# Patient Record
Sex: MALE | Race: WHITE | NOT HISPANIC OR LATINO
[De-identification: names, ages, dates, MRNs, and addresses within clinical notes are randomized per-mention and may not be internally consistent; named-entity substitution may affect disease eponyms.]

---

## 2020-04-27 PROBLEM — Z00.00 ENCOUNTER FOR PREVENTIVE HEALTH EXAMINATION: Status: ACTIVE | Noted: 2020-04-27

## 2020-08-05 ENCOUNTER — APPOINTMENT (OUTPATIENT)
Dept: UROLOGY | Facility: CLINIC | Age: 75
End: 2020-08-05

## 2020-08-28 ENCOUNTER — APPOINTMENT (OUTPATIENT)
Dept: UROLOGY | Facility: CLINIC | Age: 75
End: 2020-08-28
Payer: MEDICARE

## 2020-08-28 VITALS — SYSTOLIC BLOOD PRESSURE: 128 MMHG | TEMPERATURE: 98 F | DIASTOLIC BLOOD PRESSURE: 70 MMHG | HEART RATE: 75 BPM

## 2020-08-28 DIAGNOSIS — Z86.79 PERSONAL HISTORY OF OTHER DISEASES OF THE CIRCULATORY SYSTEM: ICD-10-CM

## 2020-08-28 PROCEDURE — 99204 OFFICE O/P NEW MOD 45 MIN: CPT

## 2020-08-28 RX ORDER — LOSARTAN POTASSIUM AND HYDROCHLOROTHIAZIDE 12.5; 1 MG/1; MG/1
TABLET ORAL
Refills: 0 | Status: ACTIVE | COMMUNITY

## 2020-08-28 RX ORDER — FINASTERIDE 1 MG/1
TABLET ORAL
Refills: 0 | Status: ACTIVE | COMMUNITY

## 2020-08-28 NOTE — ASSESSMENT
[FreeTextEntry1] : BPH\par continue finasteride\par biggest complaint is intermittency of stream\par trial flomax\par f/u 1 motnh

## 2020-08-28 NOTE — HISTORY OF PRESENT ILLNESS
[FreeTextEntry1] : 75M previously seen by Loren comes in for evaluation. was seeing Loren for annualy PSA screening. PSA 7/2019 1.4. +frequency. on finasteride. mild urgency. mild occasional nocturia x 1-2. IPSS 13. occasional urgency. mild decreased FOS. +hesitancy. occasional double voidiing. no family history prosttae kdkristely blalailae cancer.

## 2020-08-28 NOTE — PHYSICAL EXAM
[General Appearance - Well Developed] : well developed [Well Groomed] : well groomed [Normal Appearance] : normal appearance [General Appearance - Well Nourished] : well nourished [Heart Rate And Rhythm] : Heart rate and rhythm were normal [] : no respiratory distress [Abdomen Tenderness] : non-tender [Abdomen Soft] : soft [Abdomen Hernia] : no hernia was discovered [Urethral Meatus] : meatus normal [Costovertebral Angle Tenderness] : no ~M costovertebral angle tenderness [Penis Abnormality] : normal circumcised penis [Urinary Bladder Findings] : the bladder was normal on palpation [Scrotum] : the scrotum was normal [Epididymis] : the epididymides were normal [Testes Tenderness] : no tenderness of the testes [Testes Mass (___cm)] : there were no testicular masses [Normal Station and Gait] : the gait and station were normal for the patient's age [Oriented To Time, Place, And Person] : oriented to person, place, and time [No Focal Deficits] : no focal deficits [Skin Color & Pigmentation] : normal skin color and pigmentation [No Palpable Adenopathy] : no palpable adenopathy

## 2020-12-29 ENCOUNTER — APPOINTMENT (OUTPATIENT)
Dept: UROLOGY | Facility: CLINIC | Age: 75
End: 2020-12-29

## 2021-08-09 ENCOUNTER — RX RENEWAL (OUTPATIENT)
Age: 76
End: 2021-08-09

## 2021-08-09 RX ORDER — TAMSULOSIN HYDROCHLORIDE 0.4 MG/1
0.4 CAPSULE ORAL
Qty: 90 | Refills: 3 | Status: ACTIVE | COMMUNITY
Start: 2020-08-28 | End: 1900-01-01

## 2021-08-12 RX ORDER — FINASTERIDE 5 MG/1
5 TABLET, FILM COATED ORAL DAILY
Qty: 90 | Refills: 0 | Status: ACTIVE | COMMUNITY
Start: 2021-05-19 | End: 1900-01-01

## 2021-08-18 ENCOUNTER — APPOINTMENT (OUTPATIENT)
Dept: UROLOGY | Facility: CLINIC | Age: 76
End: 2021-08-18
Payer: MEDICARE

## 2021-08-18 VITALS
DIASTOLIC BLOOD PRESSURE: 76 MMHG | WEIGHT: 165 LBS | SYSTOLIC BLOOD PRESSURE: 156 MMHG | HEIGHT: 70 IN | TEMPERATURE: 97.5 F | BODY MASS INDEX: 23.62 KG/M2 | HEART RATE: 67 BPM | OXYGEN SATURATION: 97 %

## 2021-08-18 LAB
BILIRUB UR QL STRIP: NORMAL
CLARITY UR: CLEAR
COLLECTION METHOD: NORMAL
GLUCOSE UR-MCNC: NORMAL
HCG UR QL: 0.2 EU/DL
HGB UR QL STRIP.AUTO: NORMAL
KETONES UR-MCNC: NORMAL
LEUKOCYTE ESTERASE UR QL STRIP: NORMAL
NITRITE UR QL STRIP: NORMAL
PH UR STRIP: 5.5
PROT UR STRIP-MCNC: NORMAL
SP GR UR STRIP: 1.01

## 2021-08-18 PROCEDURE — 99214 OFFICE O/P EST MOD 30 MIN: CPT

## 2021-08-18 PROCEDURE — 51798 US URINE CAPACITY MEASURE: CPT

## 2021-08-18 NOTE — ASSESSMENT
[FreeTextEntry1] : We discussed other medications such as alfuzosin, but since the patient has mild and tolerable symptoms, we will delay trying these at present. I sent urine for culture and blood for PSA (ON FINASTERIDE). \par \par We also discussed stopping the finasteride moving forward and the potential effect this could have on his LUTS and PSA. He would like to try this and WE STOPPED THE FINASTERIDE AS OF TODAY 8/18/2021). I recommended that the patinet return in 3 months with a full bladder for URoflowmetry and PVR. Alexsandra Pimentel MD\par

## 2021-08-18 NOTE — PHYSICAL EXAM
[General Appearance - Well Developed] : well developed [General Appearance - Well Nourished] : well nourished [Normal Appearance] : normal appearance [Well Groomed] : well groomed [General Appearance - In No Acute Distress] : no acute distress [Abdomen Soft] : soft [Abdomen Tenderness] : non-tender [Abdomen Hernia] : no hernia was discovered [Costovertebral Angle Tenderness] : no ~M costovertebral angle tenderness [Urethral Meatus] : meatus normal [Epididymis] : the epididymides were normal [Testes Tenderness] : no tenderness of the testes [Testes Mass (___cm)] : there were no testicular masses [Prostate Tenderness] : the prostate was not tender [No Prostate Nodules] : no prostate nodules [Prostate Size ___ (0-4)] : prostate size [unfilled] (scale: 0-4) [Skin Turgor] : supple [Edema] : no peripheral edema [] : no respiratory distress [Oriented To Time, Place, And Person] : oriented to person, place, and time [Affect] : the affect was normal [Mood] : the mood was normal [Not Anxious] : not anxious [Normal Station and Gait] : the gait and station were normal for the patient's age [No Focal Deficits] : no focal deficits

## 2021-08-18 NOTE — LETTER BODY
[Dear  ___] : Dear  [unfilled], [Courtesy Letter:] : I had the pleasure of seeing your patient, [unfilled], in my office today. [Please see my note below.] : Please see my note below. [Consult Closing:] : Thank you very much for allowing me to participate in the care of this patient.  If you have any questions, please do not hesitate to contact me. [FreeTextEntry3] : Best Regards, \par \par Alexsandra Pimentel MD\par

## 2021-08-18 NOTE — HISTORY OF PRESENT ILLNESS
[FreeTextEntry1] : 76 YO M seen by me 7/3/2019 for BPH and Peyronie's Disease associated with a dorsal curvature that did not interfere with satisfactory intercourse. He was on finasteride and PSA was 1.36 (41%).\par \par Patient seen on 8/28/2020 by Nick with increase in LUTS, finasteride renewed, tamsulosin trial instituted.\par \par Patient seen TODAY to FU on these issues. e told me that he only tried the tamsulosin x 1 dose due to feeling of fatigue the next day. At present e is only taking the finasteride and is urinating well except for hesitancy during the night and nocturia x 2 He would like to stop the finasteride, which he has been on for ~ 4 years. \par UA negative\par PVR 13 cc\par IPSS 16\par ROXIE 19\par NAA 9\par \par Patient continue to be =on medication for his HTN and cholesterol.\par \par  The patient denies fevers, chills, nausea and or vomiting and no unexplained weight loss. \par All pertinent parts of the patient PFSH (past medical, family and social histories), laboratory, radiological studies and physician notes were reviewed prior to starting the face to face portion of the  visit. Questionnaire results were discussed with patient.\par

## 2021-08-19 LAB — PSA SERPL-MCNC: 0.96 NG/ML

## 2021-08-20 LAB — BACTERIA UR CULT: NORMAL

## 2022-01-20 ENCOUNTER — APPOINTMENT (OUTPATIENT)
Dept: UROLOGY | Facility: CLINIC | Age: 77
End: 2022-01-20
Payer: MEDICARE

## 2022-01-20 VITALS
HEIGHT: 71 IN | OXYGEN SATURATION: 98 % | WEIGHT: 165 LBS | HEART RATE: 68 BPM | SYSTOLIC BLOOD PRESSURE: 164 MMHG | BODY MASS INDEX: 23.1 KG/M2 | DIASTOLIC BLOOD PRESSURE: 75 MMHG | TEMPERATURE: 98.2 F

## 2022-01-20 PROCEDURE — 99214 OFFICE O/P EST MOD 30 MIN: CPT

## 2022-01-20 PROCEDURE — 51741 ELECTRO-UROFLOWMETRY FIRST: CPT

## 2022-01-20 PROCEDURE — 51798 US URINE CAPACITY MEASURE: CPT

## 2022-01-20 NOTE — ASSESSMENT
[FreeTextEntry1] : We discussed the patient's stable lower urinary tract symptoms off of medication and he finds these to be tolerable.  He is not anxious to take medication for this issue and I agree with this desire.  I recommended that we reassess in 6 months.\par \par As for the microscopic hematuria seen on dipstick evaluation today, I sent urine for microscopic examination culture and cytology.  If all are negative, we will reassess in 6 months otherwise we will proceed as indicated by the results. Alexsandra Pimentel MD\par

## 2022-01-20 NOTE — LETTER BODY
[Dear  ___] : Dear  [unfilled], [Courtesy Letter:] : I had the pleasure of seeing your patient, [unfilled], in my office today. [Please see my note below.] : Please see my note below. [FreeTextEntry2] : S [FreeTextEntry3] : Best Regards, \par \par Alexsandra Pimentel MD\par

## 2022-01-20 NOTE — HISTORY OF PRESENT ILLNESS
[FreeTextEntry1] : 77 YO M seen by me 7/3/2019 for BPH and Peyronie's Disease associated with a dorsal curvature that did not interfere with satisfactory intercourse. He was on finasteride and PSA was 1.36 (41%).\par \par Patient seen on 8/28/2020 by Nick with increase in LUTS, finasteride renewed, tamsulosin trial instituted.\par \par Patient seen 8/21/2021 to FU on these issues. e told me that he only tried the tamsulosin x 1 dose due to feeling of fatigue the next day. At present e is only taking the finasteride and is urinating well except for hesitancy during the night and nocturia x 2 He would like to stop the finasteride, which he has been on for ~ 4 years. \par UA negative\par PVR 13 cc\par IPSS 16\par ROXIE 19\par NAA 9\par IN view of mild and stable symptoms, no medication added and finasteride discontinued.\par \par Patient seen TODAY 1/20/2022 to reassess. He told me that he is urinating well but with an intermittent stream as his greatest complaint. He remains off medication and is satisfied with that. \par UA trace RBC\par PVR 45 cc\par Uroflow:\par Vol 444 cc\par V Max 14 cc/s\par V Ave 5 cc/s\par IPSS 14\par NAA 1\par \par Patient continue to be on medication for his HTN and cholesterol.\par \par  The patient denies fevers, chills, nausea and or vomiting and no unexplained weight loss. \par All pertinent parts of the patient PFSH (past medical, family and social histories), laboratory, radiological studies and physician notes were reviewed prior to starting the face to face portion of the  visit. Questionnaire results were discussed with patient.\par

## 2022-01-21 LAB
APPEARANCE: CLEAR
BACTERIA: NEGATIVE
BILIRUB UR QL STRIP: NORMAL
BILIRUBIN URINE: NEGATIVE
BLOOD URINE: NEGATIVE
CLARITY UR: CLEAR
COLLECTION METHOD: NORMAL
COLOR: COLORLESS
GLUCOSE QUALITATIVE U: NEGATIVE
GLUCOSE UR-MCNC: NORMAL
HCG UR QL: 0.2 EU/DL
HGB UR QL STRIP.AUTO: NORMAL
HYALINE CASTS: 0 /LPF
KETONES UR-MCNC: NORMAL
KETONES URINE: NEGATIVE
LEUKOCYTE ESTERASE UR QL STRIP: NORMAL
LEUKOCYTE ESTERASE URINE: NEGATIVE
MICROSCOPIC-UA: NORMAL
NITRITE UR QL STRIP: NORMAL
NITRITE URINE: NEGATIVE
PH UR STRIP: 5.5
PH URINE: 6
PROT UR STRIP-MCNC: NORMAL
PROTEIN URINE: NEGATIVE
RED BLOOD CELLS URINE: 0 /HPF
SP GR UR STRIP: 1.01
SPECIFIC GRAVITY URINE: 1.01
SQUAMOUS EPITHELIAL CELLS: 0 /HPF
UROBILINOGEN URINE: NORMAL
WHITE BLOOD CELLS URINE: 0 /HPF

## 2022-01-24 LAB
BACTERIA UR CULT: NORMAL
URINE CYTOLOGY: NORMAL

## 2022-07-21 ENCOUNTER — APPOINTMENT (OUTPATIENT)
Dept: UROLOGY | Facility: CLINIC | Age: 77
End: 2022-07-21

## 2022-07-21 VITALS
HEART RATE: 72 BPM | TEMPERATURE: 98 F | SYSTOLIC BLOOD PRESSURE: 158 MMHG | RESPIRATION RATE: 18 BRPM | DIASTOLIC BLOOD PRESSURE: 76 MMHG | WEIGHT: 165 LBS | HEIGHT: 71 IN | BODY MASS INDEX: 23.1 KG/M2

## 2022-07-21 DIAGNOSIS — I86.1 SCROTAL VARICES: ICD-10-CM

## 2022-07-21 DIAGNOSIS — N48.6 INDURATION PENIS PLASTICA: ICD-10-CM

## 2022-07-21 LAB
BILIRUB UR QL STRIP: NORMAL
CLARITY UR: CLEAR
COLLECTION METHOD: NORMAL
GLUCOSE UR-MCNC: NORMAL
HCG UR QL: 0.2 EU/DL
HGB UR QL STRIP.AUTO: NORMAL
KETONES UR-MCNC: NORMAL
LEUKOCYTE ESTERASE UR QL STRIP: NORMAL
NITRITE UR QL STRIP: NORMAL
PH UR STRIP: 5.5
PROT UR STRIP-MCNC: NORMAL
SP GR UR STRIP: 1

## 2022-07-21 PROCEDURE — 51741 ELECTRO-UROFLOWMETRY FIRST: CPT

## 2022-07-21 PROCEDURE — 51798 US URINE CAPACITY MEASURE: CPT

## 2022-07-21 PROCEDURE — 99214 OFFICE O/P EST MOD 30 MIN: CPT

## 2022-07-21 PROCEDURE — 81003 URINALYSIS AUTO W/O SCOPE: CPT | Mod: QW

## 2022-07-21 NOTE — PHYSICAL EXAM
[General Appearance - Well Developed] : well developed [General Appearance - Well Nourished] : well nourished [Normal Appearance] : normal appearance [Well Groomed] : well groomed [General Appearance - In No Acute Distress] : no acute distress [Abdomen Soft] : soft [Abdomen Tenderness] : non-tender [Costovertebral Angle Tenderness] : no ~M costovertebral angle tenderness [Urethral Meatus] : meatus normal [Penis Abnormality] : normal circumcised penis [Urinary Bladder Findings] : the bladder was normal on palpation [Epididymis] : the epididymides were normal [Testes Tenderness] : no tenderness of the testes [Testes Mass (___cm)] : there were no testicular masses [Edema] : no peripheral edema [] : no respiratory distress [Respiration, Rhythm And Depth] : normal respiratory rhythm and effort [Exaggerated Use Of Accessory Muscles For Inspiration] : no accessory muscle use [Oriented To Time, Place, And Person] : oriented to person, place, and time [Affect] : the affect was normal [Mood] : the mood was normal [Not Anxious] : not anxious [Normal Station and Gait] : the gait and station were normal for the patient's age [No Focal Deficits] : no focal deficits [FreeTextEntry1] : 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 No other lesions noted, no bleeding at this examination.

## 2022-07-21 NOTE — HISTORY OF PRESENT ILLNESS
[FreeTextEntry1] : 77 YO M seen by me 7/3/2019 for BPH and Peyronie's Disease associated with a dorsal curvature that did not interfere with satisfactory intercourse. He was on finasteride and PSA was 1.36 (41%).\par \par Patient seen on 8/28/2020 by Nick with increase in LUTS, finasteride renewed, tamsulosin trial instituted.\par \par Patient seen 8/21/2021 to FU on these issues. e told me that he only tried the tamsulosin x 1 dose due to feeling of fatigue the next day. At present e is only taking the finasteride and is urinating well except for hesitancy during the night and nocturia x 2 He would like to stop the finasteride, which he has been on for ~ 4 years. \par UA negative\par PVR 13 cc\par IPSS 16\par ROXIE 19\par NAA 9\par IN view of mild and stable symptoms, no medication added and finasteride discontinued. \par PSA 0.96 (= 1.92, Finasteride effect).\par \par Patient seen  1/20/2022 to reassess. He told me that he is urinating well but with an intermittent stream as his greatest complaint. He remains off medication and is satisfied with that. \par UA trace RBC\par PVR 45 cc\par Uroflow:\par Vol 444 cc\par V Max 14 cc/s\par V Ave 5 cc/s\par IPSS 14\par ANA 1\par We discussed the patient's stable lower urinary tract symptoms off of medication and he finds these to be tolerable. He is not anxious to take medication for this issue and I agree with this desire. I recommended that we reassess in 6 months.\par As for the microscopic hematuria seen on dipstick evaluation today, I sent urine for microscopic examination culture and cytology. If all are negative, we will reassess in 6 months otherwise we will proceed as indicated by the results.\par UA Micro negative\par C+S negative\par Cytology negative\par \par Patient seen TODAY to reassess off medication. H continues to urinate well off medication. He has nocturia x 1 - 2 only if he drinks a large amount of water in the evening. He is content to remain off prostate medications. He denies any dysuria or gross hematuria. Patient also describes a small bump on his scrotum that bled after being abraded with a towel recently. He is not aware how long he has had this.\par UA trace RBC, small WBC\par URoflow fair to good: Vol 126 cc, V Max 11 cc/s, V Ave 4 cc/s.\par PVR 4 cc\par IPSS 8\par ROXIE 4\par \par Patient continue to be on medication for his HTN and cholesterol.\par  The patient denies fevers, chills, nausea and or vomiting and no unexplained weight loss. \par All pertinent parts of the patient PFSH (past medical, family and social histories), laboratory, radiological studies and physician notes were reviewed prior to starting the face to face portion of the  visit. Questionnaire results were discussed with patient.\par

## 2022-07-21 NOTE — ASSESSMENT
[FreeTextEntry1] : I reviewed with the patient his present urinary symptoms in view of his uroflow and PVR test today.  It is not likely that the medications typically used for the prostate enlargement would help the symptoms in view of these test results today.  He is comfortable being off medication and I recommended that he continue this way with reassessment in 6 months at that time he will also require CARLOS.  In the meantime we discussed decreasing H2O intake after 5 PM in an effort to minimize any further nocturia.  He will try this approach.\par \par As for the new problem of the cutaneous scrotal varix, no intervention is indicated at this time, however if it continues to bleed, it would be amenable to cutaneous cauterization in the office under local anesthesia and I discussed this approach with the patient in detail including the indications risks alternatives and chances for success while I do not see the need for it to be done now he will consider it for the future if he has not further episodes of bleeding from this area.\par \par As for the asymptomatic microscopic hematuria found on dipstick today, earlier in the year when this was found microscopic evaluation did not confirm blood in the urine and culture and cytology were both negative.  I repeated the microscopic UA culture and cytology today.  While this finding has not yet been evaluated completely, I we will continue to test the urine as noted above only in the face of a negative microscopic UA. Alexsandra Pimentel MD\par

## 2022-07-25 LAB — BACTERIA UR CULT: NORMAL

## 2022-07-28 LAB
APPEARANCE: CLEAR
BACTERIA: NEGATIVE
BILIRUBIN URINE: NEGATIVE
BLOOD URINE: NEGATIVE
COLOR: NORMAL
GLUCOSE QUALITATIVE U: NEGATIVE
HYALINE CASTS: 0 /LPF
KETONES URINE: NEGATIVE
LEUKOCYTE ESTERASE URINE: ABNORMAL
MICROSCOPIC-UA: NORMAL
NITRITE URINE: NEGATIVE
PH URINE: 6
PROTEIN URINE: NEGATIVE
RED BLOOD CELLS URINE: 1 /HPF
SPECIFIC GRAVITY URINE: 1.01
SQUAMOUS EPITHELIAL CELLS: 0 /HPF
URINE CYTOLOGY: NORMAL
UROBILINOGEN URINE: NORMAL
WHITE BLOOD CELLS URINE: 11 /HPF

## 2022-07-29 ENCOUNTER — NON-APPOINTMENT (OUTPATIENT)
Age: 77
End: 2022-07-29

## 2022-07-29 LAB — PSA SERPL-MCNC: 11.3 NG/ML

## 2022-08-03 ENCOUNTER — APPOINTMENT (OUTPATIENT)
Dept: UROLOGY | Facility: CLINIC | Age: 77
End: 2022-08-03

## 2022-08-03 VITALS — SYSTOLIC BLOOD PRESSURE: 144 MMHG | HEART RATE: 72 BPM | DIASTOLIC BLOOD PRESSURE: 72 MMHG | TEMPERATURE: 97.3 F

## 2022-08-03 LAB
BILIRUB UR QL STRIP: NORMAL
CLARITY UR: CLEAR
COLLECTION METHOD: NORMAL
GLUCOSE UR-MCNC: NORMAL
HCG UR QL: 0.2 EU/DL
HGB UR QL STRIP.AUTO: NORMAL
KETONES UR-MCNC: NORMAL
LEUKOCYTE ESTERASE UR QL STRIP: NORMAL
NITRITE UR QL STRIP: NORMAL
PH UR STRIP: 7
PROT UR STRIP-MCNC: NORMAL
SP GR UR STRIP: 1.01

## 2022-08-03 PROCEDURE — 99214 OFFICE O/P EST MOD 30 MIN: CPT

## 2022-08-03 PROCEDURE — 81003 URINALYSIS AUTO W/O SCOPE: CPT | Mod: QW

## 2022-08-03 PROCEDURE — 76857 US EXAM PELVIC LIMITED: CPT

## 2022-08-03 NOTE — HISTORY OF PRESENT ILLNESS
[FreeTextEntry1] : 77 YO M seen by me 7/3/2019 for BPH and Peyronie's Disease associated with a dorsal curvature that did not interfere with satisfactory intercourse. He was on finasteride and PSA was 1.36 (41%).\par \par Patient seen on 8/28/2020 by Nick with increase in LUTS, finasteride renewed, tamsulosin trial instituted.\par \par Patient seen 8/21/2021 to FU on these issues. e told me that he only tried the tamsulosin x 1 dose due to feeling of fatigue the next day. At present e is only taking the finasteride and is urinating well except for hesitancy during the night and nocturia x 2 He would like to stop the finasteride, which he has been on for ~ 4 years. \par UA negative\par PVR 13 cc\par IPSS 16\par ROXIE 19\par NAA 9\par IN view of mild and stable symptoms, no medication added and finasteride discontinued. \par PSA 0.96 (= 1.92, Finasteride effect).\par \par Patient seen  1/20/2022 to reassess. He told me that he is urinating well but with an intermittent stream as his greatest complaint. He remains off medication and is satisfied with that. \par UA trace RBC\par PVR 45 cc\par Uroflow:\par Vol 444 cc\par V Max 14 cc/s\par V Ave 5 cc/s\par IPSS 14\par NAA 1\par We discussed the patient's stable lower urinary tract symptoms off of medication and he finds these to be tolerable. He is not anxious to take medication for this issue and I agree with this desire. I recommended that we reassess in 6 months.\par As for the microscopic hematuria seen on dipstick evaluation today, I sent urine for microscopic examination culture and cytology. If all are negative, we will reassess in 6 months otherwise we will proceed as indicated by the results.\par UA Micro negative\par C+S negative\par Cytology negative\par \par Patient seen 7/21/2022  to reassess off medication. H continues to urinate well off medication. He has nocturia x 1 - 2 only if he drinks a large amount of water in the evening. He is content to remain off prostate medications. He denies any dysuria or gross hematuria. Patient also describes a small bump on his scrotum that bled after being abraded with a towel recently. He is not aware how long he has had this.\par UA trace RBC, small WBC\par URoflow fair to good: Vol 126 cc, V Max 11 cc/s, V Ave 4 cc/s.\par PVR 4 cc\par IPSS 8\par ROXIE 4\par I  reviewed with the patient his present urinary symptoms in view of his uroflow and PVR test today. It is not likely that the medications typically used for the prostate enlargement would help the symptoms in view of these test results today. He is comfortable being off medication and I recommended that he continue this way with reassessment in 6 months at that time he will also require CARLOS. In the meantime we discussed decreasing H2O intake after 5 PM in an effort to minimize any further nocturia. He will try this approach.\par As for the new problem of the cutaneous scrotal varix, no intervention is indicated at this time, however if it continues to bleed, it would be amenable to cutaneous cauterization in the office under local anesthesia and I discussed this approach with the patient in detail including the indications risks alternatives and chances for success while I do not see the need for it to be done now he will consider it for the future if he has not further episodes of bleeding from this area.\par As for the asymptomatic microscopic hematuria found on dipstick today, earlier in the year when this was found microscopic evaluation did not confirm blood in the urine and culture and cytology were both negative. I repeated the microscopic UA culture and cytology today. While this finding has not yet been evaluated completely, I we will continue to test the urine as noted above only in the face of a negative microscopic UA.\par PSA 11.3 UA WBC, C+S negative, Cytology inflammatory cells.\par \par Patient seen TODAY 8/3/2022 to reassess PSA, CARLOS, prostate. He remains stable with no new issues.\par UA trace WBC only,\par IPSS 14\par Pelvic US: Prostate 60 gm, PVR 53 cc.\par \par Patient continue to be on medication for his HTN and cholesterol.\par  The patient denies fevers, chills, nausea and or vomiting and no unexplained weight loss. \par All pertinent parts of the patient PFSH (past medical, family and social histories), laboratory, radiological studies and physician notes were reviewed prior to starting the face to face portion of the  visit. Questionnaire results were discussed with patient.\par

## 2022-08-03 NOTE — PHYSICAL EXAM
[General Appearance - Well Developed] : well developed [General Appearance - Well Nourished] : well nourished [Normal Appearance] : normal appearance [Well Groomed] : well groomed [General Appearance - In No Acute Distress] : no acute distress [Abdomen Soft] : soft [Abdomen Tenderness] : non-tender [Costovertebral Angle Tenderness] : no ~M costovertebral angle tenderness [Prostate Tenderness] : the prostate was not tender [No Prostate Nodules] : no prostate nodules [Prostate Size ___ gm] : prostate size [unfilled] gm [FreeTextEntry1] : The prostate is normal to palpation in all respects. [Oriented To Time, Place, And Person] : oriented to person, place, and time [Affect] : the affect was normal [Mood] : the mood was normal [Not Anxious] : not anxious

## 2022-08-03 NOTE — ASSESSMENT
[FreeTextEntry1] : We discussed the recent spike in the PSA, the etiology of which remains uncertain at this time.  He does not have any significant change in his lower urinary tract symptoms but I did repeat the urine culture based on the trace white blood cells in his UA.  We also sent blood today for total and free PSA test.  If this confirms same elevation in PSA, then we will proceed to prostate MRI and likely needle biopsy.  If it is improved, then we will follow this to its priscilla and then make that decision.  We will plan to follow-up in at most 3 months. Alexsandra Pimentel MD\par

## 2022-08-05 ENCOUNTER — NON-APPOINTMENT (OUTPATIENT)
Age: 77
End: 2022-08-05

## 2022-08-05 LAB
BACTERIA UR CULT: NORMAL
PSA FREE FLD-MCNC: 30 %
PSA FREE SERPL-MCNC: 2.05 NG/ML
PSA SERPL-MCNC: 6.79 NG/ML

## 2022-08-24 ENCOUNTER — NON-APPOINTMENT (OUTPATIENT)
Age: 77
End: 2022-08-24

## 2022-08-24 LAB
PSA FREE FLD-MCNC: 41 %
PSA FREE SERPL-MCNC: 2.27 NG/ML
PSA SERPL-MCNC: 5.56 NG/ML

## 2022-09-14 ENCOUNTER — NON-APPOINTMENT (OUTPATIENT)
Age: 77
End: 2022-09-14

## 2022-09-14 LAB
PSA FREE FLD-MCNC: 44 %
PSA FREE SERPL-MCNC: 1.69 NG/ML
PSA SERPL-MCNC: 3.87 NG/ML

## 2023-01-19 ENCOUNTER — APPOINTMENT (OUTPATIENT)
Dept: UROLOGY | Facility: CLINIC | Age: 78
End: 2023-01-19
Payer: MEDICARE

## 2023-01-19 VITALS
BODY MASS INDEX: 23.62 KG/M2 | HEART RATE: 78 BPM | TEMPERATURE: 98 F | SYSTOLIC BLOOD PRESSURE: 176 MMHG | OXYGEN SATURATION: 98 % | HEIGHT: 70 IN | WEIGHT: 165 LBS | DIASTOLIC BLOOD PRESSURE: 73 MMHG

## 2023-01-19 PROCEDURE — 81003 URINALYSIS AUTO W/O SCOPE: CPT | Mod: QW

## 2023-01-19 PROCEDURE — 99213 OFFICE O/P EST LOW 20 MIN: CPT

## 2023-01-19 NOTE — ASSESSMENT
[FreeTextEntry1] : We discussed his most recent PSA which is stable. CARLOS on exam today was normal. A repeat PSA is pending and we will call him with that result. If this remains stable, he will follow up in 1 year. \par \par We also discussed his prior episode of microhematuria, which has resolved on UA dip today and requires no additional intervention at this time. Alexsandra Pimentel MD\par

## 2023-01-19 NOTE — PHYSICAL EXAM
[General Appearance - Well Developed] : well developed [General Appearance - Well Nourished] : well nourished [Normal Appearance] : normal appearance [Well Groomed] : well groomed [General Appearance - In No Acute Distress] : no acute distress [Oriented To Time, Place, And Person] : oriented to person, place, and time [Affect] : the affect was normal [Mood] : the mood was normal [Not Anxious] : not anxious [Prostate Tenderness] : the prostate was not tender [No Prostate Nodules] : no prostate nodules [Prostate Size ___ (0-4)] : prostate size [unfilled] (scale: 0-4)

## 2023-01-19 NOTE — HISTORY OF PRESENT ILLNESS
[FreeTextEntry1] : 76 YO M seen by me 7/3/2019 for BPH and Peyronie's Disease associated with a dorsal curvature that did not interfere with satisfactory intercourse. He was on finasteride and PSA was 1.36 (41%).\par \par Patient seen on 8/28/2020 by Nick with increase in LUTS, finasteride renewed, tamsulosin trial instituted.\par \par Patient seen 8/21/2021 to FU on these issues. e told me that he only tried the tamsulosin x 1 dose due to feeling of fatigue the next day. At present e is only taking the finasteride and is urinating well except for hesitancy during the night and nocturia x 2 He would like to stop the finasteride, which he has been on for ~ 4 years. \par UA negative\par PVR 13 cc\par IPSS 16\par ROXIE 19\par NAA 9\par IN view of mild and stable symptoms, no medication added and finasteride discontinued. \par PSA 0.96 (= 1.92, Finasteride effect).\par \par Patient seen  1/20/2022 to reassess. He told me that he is urinating well but with an intermittent stream as his greatest complaint. He remains off medication and is satisfied with that. \par UA trace RBC\par PVR 45 cc\par Uroflow:\par Vol 444 cc\par V Max 14 cc/s\par V Ave 5 cc/s\par IPSS 14\par NAA 1\par We discussed the patient's stable lower urinary tract symptoms off of medication and he finds these to be tolerable. He is not anxious to take medication for this issue and I agree with this desire. I recommended that we reassess in 6 months.\par As for the microscopic hematuria seen on dipstick evaluation today, I sent urine for microscopic examination culture and cytology. If all are negative, we will reassess in 6 months otherwise we will proceed as indicated by the results.\par UA Micro negative\par C+S negative\par Cytology negative\par \par Patient seen 7/21/2022  to reassess off medication. H continues to urinate well off medication. He has nocturia x 1 - 2 only if he drinks a large amount of water in the evening. He is content to remain off prostate medications. He denies any dysuria or gross hematuria. Patient also describes a small bump on his scrotum that bled after being abraded with a towel recently. He is not aware how long he has had this.\par UA trace RBC, small WBC\par URoflow fair to good: Vol 126 cc, V Max 11 cc/s, V Ave 4 cc/s.\par PVR 4 cc\par IPSS 8\par ROXIE 4\par I  reviewed with the patient his present urinary symptoms in view of his uroflow and PVR test today. It is not likely that the medications typically used for the prostate enlargement would help the symptoms in view of these test results today. He is comfortable being off medication and I recommended that he continue this way with reassessment in 6 months at that time he will also require CARLOS. In the meantime we discussed decreasing H2O intake after 5 PM in an effort to minimize any further nocturia. He will try this approach.\par As for the new problem of the cutaneous scrotal varix, no intervention is indicated at this time, however if it continues to bleed, it would be amenable to cutaneous cauterization in the office under local anesthesia and I discussed this approach with the patient in detail including the indications risks alternatives and chances for success while I do not see the need for it to be done now he will consider it for the future if he has not further episodes of bleeding from this area.\par As for the asymptomatic microscopic hematuria found on dipstick today, earlier in the year when this was found microscopic evaluation did not confirm blood in the urine and culture and cytology were both negative. I repeated the microscopic UA culture and cytology today. While this finding has not yet been evaluated completely, I we will continue to test the urine as noted above only in the face of a negative microscopic UA.\par PSA 11.3 UA WBC, C+S negative, Cytology inflammatory cells.\par \par Patient seen 8/3/2022 to reassess PSA, CARLOS, prostate. He remains stable with no new issues.\par UA trace WBC only,\par IPSS 14\par Pelvic US: Prostate 60 gm, PVR 53 cc.\par \par We discussed the recent spike in the PSA, the etiology of which remains uncertain at this time.  He does not have any significant change in his lower urinary tract symptoms but I did repeat the urine culture based on the trace white blood cells in his UA.  We also sent blood today for total and free PSA test.  If this confirms same elevation in PSA, then we will proceed to prostate MRI and likely needle biopsy.  If it is improved, then we will follow this to its priscilla and then make that decision.  We will plan to follow-up in at most 3 months. Alexsandra Pimentel MD\par PSA 3.87\par \par Patient seen TODAY 1/19/2023 to reassess PSA. A recent PSA of 3.75 was drawn on 10/3/22. His LUTS are stable and tolerable. He denies any gross hematuria.\par UA negative\par IPSS 14\par \par Patient continue to be on medication for his HTN and cholesterol.\par  The patient denies fevers, chills, nausea and or vomiting and no unexplained weight loss. \par All pertinent parts of the patient PFSH (past medical, family and social histories), laboratory, radiological studies and physician notes were reviewed prior to starting the face to face portion of the  visit. Questionnaire results were discussed with patient.\par

## 2023-01-20 ENCOUNTER — NON-APPOINTMENT (OUTPATIENT)
Age: 78
End: 2023-01-20

## 2023-01-20 LAB
BILIRUB UR QL STRIP: NORMAL
CLARITY UR: CLEAR
COLLECTION METHOD: NORMAL
GLUCOSE UR-MCNC: NORMAL
HCG UR QL: 0.2 EU/DL
HGB UR QL STRIP.AUTO: NORMAL
KETONES UR-MCNC: NORMAL
LEUKOCYTE ESTERASE UR QL STRIP: NORMAL
NITRITE UR QL STRIP: NORMAL
PH UR STRIP: 5
PROT UR STRIP-MCNC: NORMAL
PSA FREE FLD-MCNC: 45 %
PSA FREE SERPL-MCNC: 1.51 NG/ML
PSA SERPL-MCNC: 3.38 NG/ML
SP GR UR STRIP: 1.02

## 2024-02-01 ENCOUNTER — APPOINTMENT (OUTPATIENT)
Dept: UROLOGY | Facility: CLINIC | Age: 79
End: 2024-02-01
Payer: MEDICARE

## 2024-02-01 VITALS
HEART RATE: 71 BPM | BODY MASS INDEX: 23.62 KG/M2 | OXYGEN SATURATION: 98 % | HEIGHT: 70 IN | TEMPERATURE: 97.16 F | SYSTOLIC BLOOD PRESSURE: 165 MMHG | DIASTOLIC BLOOD PRESSURE: 69 MMHG | WEIGHT: 165 LBS

## 2024-02-01 DIAGNOSIS — R97.20 ELEVATED PROSTATE, SPECIFIC ANTIGEN [PSA]: ICD-10-CM

## 2024-02-01 PROCEDURE — 76857 US EXAM PELVIC LIMITED: CPT

## 2024-02-01 PROCEDURE — 99214 OFFICE O/P EST MOD 30 MIN: CPT

## 2024-02-01 PROCEDURE — 81003 URINALYSIS AUTO W/O SCOPE: CPT | Mod: QW

## 2024-02-01 NOTE — HISTORY OF PRESENT ILLNESS
[FreeTextEntry1] : 70 YO M seen by me 7/3/2019 for BPH and Peyronie's Disease associated with a dorsal curvature that did not interfere with satisfactory intercourse. He was on finasteride and PSA was 1.36 (41%).  Patient seen on 8/28/2020 by Nick with increase in LUTS, finasteride renewed, tamsulosin trial instituted.  Patient seen 8/21/2021 to FU on these issues. e told me that he only tried the tamsulosin x 1 dose due to feeling of fatigue the next day. At present e is only taking the finasteride and is urinating well except for hesitancy during the night and nocturia x 2 He would like to stop the finasteride, which he has been on for ~ 4 years.  UA negative PVR 13 cc IPSS 16 ROXIE 19 NAA 9 IN view of mild and stable symptoms, no medication added and finasteride discontinued.  PSA 0.96 (= 1.92, Finasteride effect).  Patient seen  1/20/2022 to reassess. He told me that he is urinating well but with an intermittent stream as his greatest complaint. He remains off medication and is satisfied with that.  UA trace RBC PVR 45 cc Uroflow: Vol 444 cc V Max 14 cc/s V Ave 5 cc/s IPSS 14 NAA 1 We discussed the patient's stable lower urinary tract symptoms off of medication and he finds these to be tolerable. He is not anxious to take medication for this issue and I agree with this desire. I recommended that we reassess in 6 months. As for the microscopic hematuria seen on dipstick evaluation today, I sent urine for microscopic examination culture and cytology. If all are negative, we will reassess in 6 months otherwise we will proceed as indicated by the results. UA Micro negative C+S negative Cytology negative  Patient seen 7/21/2022  to reassess off medication. H continues to urinate well off medication. He has nocturia x 1 - 2 only if he drinks a large amount of water in the evening. He is content to remain off prostate medications. He denies any dysuria or gross hematuria. Patient also describes a small bump on his scrotum that bled after being abraded with a towel recently. He is not aware how long he has had this. UA trace RBC, small WBC URoflow fair to good: Vol 126 cc, V Max 11 cc/s, V Ave 4 cc/s. PVR 4 cc IPSS 8 ROXIE 4 I  reviewed with the patient his present urinary symptoms in view of his uroflow and PVR test today. It is not likely that the medications typically used for the prostate enlargement would help the symptoms in view of these test results today. He is comfortable being off medication and I recommended that he continue this way with reassessment in 6 months at that time he will also require CARLOS. In the meantime we discussed decreasing H2O intake after 5 PM in an effort to minimize any further nocturia. He will try this approach. As for the new problem of the cutaneous scrotal varix, no intervention is indicated at this time, however if it continues to bleed, it would be amenable to cutaneous cauterization in the office under local anesthesia and I discussed this approach with the patient in detail including the indications risks alternatives and chances for success while I do not see the need for it to be done now he will consider it for the future if he has not further episodes of bleeding from this area. As for the asymptomatic microscopic hematuria found on dipstick today, earlier in the year when this was found microscopic evaluation did not confirm blood in the urine and culture and cytology were both negative. I repeated the microscopic UA culture and cytology today. While this finding has not yet been evaluated completely, I we will continue to test the urine as noted above only in the face of a negative microscopic UA. PSA 11.3 UA WBC, C+S negative, Cytology inflammatory cells.  Patient seen 8/3/2022 to reassess PSA, CARLOS, prostate. He remains stable with no new issues. UA trace WBC only, IPSS 14 Pelvic US: Prostate 60 gm, PVR 53 cc.  We discussed the recent spike in the PSA, the etiology of which remains uncertain at this time.  He does not have any significant change in his lower urinary tract symptoms but I did repeat the urine culture based on the trace white blood cells in his UA.  We also sent blood today for total and free PSA test.  If this confirms same elevation in PSA, then we will proceed to prostate MRI and likely needle biopsy.  If it is improved, then we will follow this to its priscilla and then make that decision.  We will plan to follow-up in at most 3 months. Alexsandra Pimentel MD PSA 3.87  Patient seen 1/19/2023 to reassess PSA. A recent PSA of 3.75 was drawn on 10/3/22. His LUTS are stable and tolerable. He denies any gross hematuria. UA negative IPSS 14 Patient continue to be on medication for his HTN and cholesterol.  The patient denies fevers, chills, nausea and or vomiting and no unexplained weight loss.  All pertinent parts of the patient PFSH (past medical, family and social histories), laboratory, radiological studies and physician notes were reviewed prior to starting the face to face portion of the  visit. Questionnaire results were discussed with patient. We discussed his most recent PSA which is stable. CARLOS on exam today was normal. A repeat PSA is pending and we will call him with that result. If this remains stable, he will follow up in 1 year. We also discussed his prior episode of microhematuria, which has resolved on UA dip today and requires no additional intervention at this time. .PSA 3.38 (40%), (=6.76 on finasteride)  Patient seen TODAY 2/1/2024 to reassess. He is urinating with tolerable LUTS off any medication having stopped the finasteride and tamsulosin 1 year ego. He had a recent PSA with his PCP 11/20/2023 which returned 3.11 9off the finasteride) and UA showed small RBC. HE also had anemia on CBC. UA trace RBC, not yet evaluated. IPSS 15 NAA 7 ROXIE 0 Patient and wife are no longer sexually active.  Pelvic US: PVR 63ml, PRostate 71gm.

## 2024-02-01 NOTE — PHYSICAL EXAM
[Normal Appearance] : normal appearance [Well Groomed] : well groomed [General Appearance - In No Acute Distress] : no acute distress [Edema] : no peripheral edema [Respiration, Rhythm And Depth] : normal respiratory rhythm and effort [Exaggerated Use Of Accessory Muscles For Inspiration] : no accessory muscle use [Abdomen Soft] : soft [Abdomen Tenderness] : non-tender [Abdomen Mass (___ Cm)] : no abdominal mass palpated [Costovertebral Angle Tenderness] : no ~M costovertebral angle tenderness [Urethral Meatus] : meatus normal [Penis Abnormality] : normal circumcised penis [Urinary Bladder Findings] : the bladder was normal on palpation [Epididymis] : the epididymides were normal [Testes Tenderness] : no tenderness of the testes [Testes Mass (___cm)] : there were no testicular masses [Prostate Tenderness] : the prostate was not tender [No Prostate Nodules] : no prostate nodules [Prostate Size ___ gm] : prostate size [unfilled] gm [Normal Station and Gait] : the gait and station were normal for the patient's age [] : no rash [No Focal Deficits] : no focal deficits [Oriented To Time, Place, And Person] : oriented to person, place, and time [Affect] : the affect was normal [Mood] : the mood was normal [No Palpable Adenopathy] : no palpable adenopathy

## 2024-02-01 NOTE — LETTER BODY
[Dear  ___] : Dear  [unfilled], [Courtesy Letter:] : I had the pleasure of seeing your patient, [unfilled], in my office today. [Please see my note below.] : Please see my note below. [Consult Closing:] : Thank you very much for allowing me to participate in the care of this patient.  If you have any questions, please do not hesitate to contact me. [FreeTextEntry3] : Best Regards,   Alexsandra Pimentel MD

## 2024-02-01 NOTE — ASSESSMENT
[FreeTextEntry1] : We discussed the patient's stable PSA which was repeated today and his normal CARLOS.  If the PSA remains stable as it was 3 months ago, then follow-up of this in 1 year.  As for the microscopic hematuria which was present today and present to a slightly greater extent on his UA with his PCP 3 months ago, he has not had a hematuria evaluation yet and I recommended that we consider this in view of his mild anemia found on last CBC.  To this extent urine sent for culture and cytology, CT scan abdomen pelvis ordered, cystoscopy in the office was scheduled after we reviewed indications, risks, alternatives and chances for success.  Alexsandra Pimentel MD.

## 2024-02-04 LAB
BACTERIA UR CULT: NORMAL
BILIRUB UR QL STRIP: NORMAL
CLARITY UR: CLEAR
COLLECTION METHOD: NORMAL
GLUCOSE UR-MCNC: NORMAL
HCG UR QL: 0.2 EU/DL
HGB UR QL STRIP.AUTO: ABNORMAL
KETONES UR-MCNC: NORMAL
LEUKOCYTE ESTERASE UR QL STRIP: NORMAL
NITRITE UR QL STRIP: NORMAL
PH UR STRIP: 6
PROT UR STRIP-MCNC: NORMAL
PSA FREE FLD-MCNC: 53 %
PSA FREE SERPL-MCNC: 1.61 NG/ML
PSA SERPL-MCNC: 3.02 NG/ML
SP GR UR STRIP: 1.01

## 2024-03-07 ENCOUNTER — APPOINTMENT (OUTPATIENT)
Dept: UROLOGY | Facility: CLINIC | Age: 79
End: 2024-03-07
Payer: MEDICARE

## 2024-03-07 DIAGNOSIS — R31.21 ASYMPTOMATIC MICROSCOPIC HEMATURIA: ICD-10-CM

## 2024-03-07 LAB
BILIRUB UR QL STRIP: NORMAL
BILIRUB UR QL STRIP: NORMAL
CLARITY UR: CLEAR
CLARITY UR: CLEAR
COLLECTION METHOD: NORMAL
COLLECTION METHOD: NORMAL
GLUCOSE UR-MCNC: NORMAL
GLUCOSE UR-MCNC: NORMAL
HCG UR QL: 0.2 EU/DL
HCG UR QL: 0.2 EU/DL
HGB UR QL STRIP.AUTO: NORMAL
HGB UR QL STRIP.AUTO: NORMAL
KETONES UR-MCNC: NORMAL
KETONES UR-MCNC: NORMAL
LEUKOCYTE ESTERASE UR QL STRIP: NORMAL
LEUKOCYTE ESTERASE UR QL STRIP: NORMAL
NITRITE UR QL STRIP: NORMAL
NITRITE UR QL STRIP: NORMAL
PH UR STRIP: 5.5
PH UR STRIP: 5.5
PROT UR STRIP-MCNC: NORMAL
PROT UR STRIP-MCNC: NORMAL
SP GR UR STRIP: 1.01
SP GR UR STRIP: 1.01

## 2024-03-07 PROCEDURE — 52000 CYSTOURETHROSCOPY: CPT

## 2024-03-07 PROCEDURE — 81003 URINALYSIS AUTO W/O SCOPE: CPT | Mod: QW

## 2024-03-07 NOTE — HISTORY OF PRESENT ILLNESS
[FreeTextEntry1] : 68 YO M seen by me 7/3/2019 for BPH and Peyronie's Disease associated with a dorsal curvature that did not interfere with satisfactory intercourse. He was on finasteride and PSA was 1.36 (41%).  Patient seen on 8/28/2020 by Nick with increase in LUTS, finasteride renewed, tamsulosin trial instituted.  Patient seen 8/21/2021 to FU on these issues. e told me that he only tried the tamsulosin x 1 dose due to feeling of fatigue the next day. At present e is only taking the finasteride and is urinating well except for hesitancy during the night and nocturia x 2 He would like to stop the finasteride, which he has been on for ~ 4 years.  UA negative PVR 13 cc IPSS 16 ROXIE 19 NAA 9 IN view of mild and stable symptoms, no medication added and finasteride discontinued.  PSA 0.96 (= 1.92, Finasteride effect).  Patient seen  1/20/2022 to reassess. He told me that he is urinating well but with an intermittent stream as his greatest complaint. He remains off medication and is satisfied with that.  UA trace RBC PVR 45 cc Uroflow: Vol 444 cc V Max 14 cc/s V Ave 5 cc/s IPSS 14 NAA 1 We discussed the patient's stable lower urinary tract symptoms off of medication and he finds these to be tolerable. He is not anxious to take medication for this issue and I agree with this desire. I recommended that we reassess in 6 months. As for the microscopic hematuria seen on dipstick evaluation today, I sent urine for microscopic examination culture and cytology. If all are negative, we will reassess in 6 months otherwise we will proceed as indicated by the results. UA Micro negative C+S negative Cytology negative  Patient seen 7/21/2022  to reassess off medication. H continues to urinate well off medication. He has nocturia x 1 - 2 only if he drinks a large amount of water in the evening. He is content to remain off prostate medications. He denies any dysuria or gross hematuria. Patient also describes a small bump on his scrotum that bled after being abraded with a towel recently. He is not aware how long he has had this. UA trace RBC, small WBC URoflow fair to good: Vol 126 cc, V Max 11 cc/s, V Ave 4 cc/s. PVR 4 cc IPSS 8 ROXIE 4 I  reviewed with the patient his present urinary symptoms in view of his uroflow and PVR test today. It is not likely that the medications typically used for the prostate enlargement would help the symptoms in view of these test results today. He is comfortable being off medication and I recommended that he continue this way with reassessment in 6 months at that time he will also require CARLOS. In the meantime we discussed decreasing H2O intake after 5 PM in an effort to minimize any further nocturia. He will try this approach. As for the new problem of the cutaneous scrotal varix, no intervention is indicated at this time, however if it continues to bleed, it would be amenable to cutaneous cauterization in the office under local anesthesia and I discussed this approach with the patient in detail including the indications risks alternatives and chances for success while I do not see the need for it to be done now he will consider it for the future if he has not further episodes of bleeding from this area. As for the asymptomatic microscopic hematuria found on dipstick today, earlier in the year when this was found microscopic evaluation did not confirm blood in the urine and culture and cytology were both negative. I repeated the microscopic UA culture and cytology today. While this finding has not yet been evaluated completely, I we will continue to test the urine as noted above only in the face of a negative microscopic UA. PSA 11.3 UA WBC, C+S negative, Cytology inflammatory cells.  Patient seen 8/3/2022 to reassess PSA, CARLOS, prostate. He remains stable with no new issues. UA trace WBC only, IPSS 14 Pelvic US: Prostate 60 gm, PVR 53 cc.  We discussed the recent spike in the PSA, the etiology of which remains uncertain at this time.  He does not have any significant change in his lower urinary tract symptoms but I did repeat the urine culture based on the trace white blood cells in his UA.  We also sent blood today for total and free PSA test.  If this confirms same elevation in PSA, then we will proceed to prostate MRI and likely needle biopsy.  If it is improved, then we will follow this to its priscilla and then make that decision.  We will plan to follow-up in at most 3 months. Alexsandra Pimentel MD PSA 3.87  Patient seen 1/19/2023 to reassess PSA. A recent PSA of 3.75 was drawn on 10/3/22. His LUTS are stable and tolerable. He denies any gross hematuria. UA negative IPSS 14 Patient continue to be on medication for his HTN and cholesterol.  The patient denies fevers, chills, nausea and or vomiting and no unexplained weight loss.  All pertinent parts of the patient PFSH (past medical, family and social histories), laboratory, radiological studies and physician notes were reviewed prior to starting the face to face portion of the  visit. Questionnaire results were discussed with patient. We discussed his most recent PSA which is stable. CARLOS on exam today was normal. A repeat PSA is pending and we will call him with that result. If this remains stable, he will follow up in 1 year. We also discussed his prior episode of microhematuria, which has resolved on UA dip today and requires no additional intervention at this time. .PSA 3.38 (40%), (=6.76 on finasteride)  Patient seen 2/1/2024 to reassess. He is urinating with tolerable LUTS off any medication having stopped the finasteride and tamsulosin 1 year ego. He had a recent PSA with his PCP 11/20/2023 which returned 3.11 (off the finasteride) and UA showed small RBC. HE also had anemia on CBC. UA trace RBC, not yet evaluated. IPSS 15 NAA 7 ROXIE 0 Patient and wife are no longer sexually active.  Pelvic US: PVR 63ml, Prostate 71gm. We discussed the patient's stable PSA which was repeated today and his normal CARLOS. If the PSA remains stable as it was 3 months ago, then follow-up of this in 1 year. As for the microscopic hematuria which was present today and present to a slightly greater extent on his UA with his PCP 3 months ago, he has not had a hematuria evaluation yet and I recommended that we consider this in view of his mild anemia found on last CBC. To this extent urine sent for culture and cytology, CT scan abdomen pelvis ordered, cystoscopy in the office was scheduled after we reviewed indications, risks, alternatives and chances for success. PSA 3.02 (53%) C+S, cytology both negative CT 2/7/2024 diverticulosis/itis, normal  tract, BPH.  Patient seen TODAY 3/7/2024 for cystoscopy. e reports no interval bleeding noted.  UA negative CYSTOSCOPY: Normal bladder mucosa, clear efflux bilaterally. bilobar obstructing BPH with extension into the bladder, normal urethra.

## 2024-03-07 NOTE — ASSESSMENT
[FreeTextEntry1] : CYSTOSCOPY: Normal bladder mucosa, clear efflux bilaterally. bilobar obstructing BPH with extension into the bladder, normal urethra.  We discussed the cystoscopic findings and the likely cause of his microscopic hematuria being from the enlarged prostate gland.  This is also likely cause of his lower urinary tract symptoms.  He has been in the past on finasteride and tamsulosin but stopped these in February a year ago because of what he perceived as lack of efficacy.  We discussed options moving forward including #1 continued observation as is without medication intervention.  #2 reinitiation of finasteride and or tamsulosin with interval reassessment.  #3 prostatic reductive intervention either by minimally or moderately invasive methods.  I offered a consultation with my partner Dr. Moe concerning #3 if he is interested.  For the time being, he has elected to stay off medications and reassess in 3 months.  I recommended that he call me if he finds any change in his symptoms in the interval. We will plan to have the patient perform Uroflow and obtain a PVR at that time.  Alexsandra Pimentel MD

## 2024-06-06 ENCOUNTER — APPOINTMENT (OUTPATIENT)
Dept: UROLOGY | Facility: CLINIC | Age: 79
End: 2024-06-06

## 2024-06-06 DIAGNOSIS — R35.1 BENIGN PROSTATIC HYPERPLASIA WITH LOWER URINARY TRACT SYMPMS: ICD-10-CM

## 2024-06-06 DIAGNOSIS — N40.1 BENIGN PROSTATIC HYPERPLASIA WITH LOWER URINARY TRACT SYMPMS: ICD-10-CM

## 2024-06-06 NOTE — HISTORY OF PRESENT ILLNESS
[FreeTextEntry1] : 70 YO M seen by me 7/3/2019 for BPH and Peyronie's Disease associated with a dorsal curvature that did not interfere with satisfactory intercourse. He was on finasteride and PSA was 1.36 (41%).  Patient seen on 8/28/2020 by Nick with increase in LUTS, finasteride renewed, tamsulosin trial instituted.  Patient seen 8/21/2021 to FU on these issues. e told me that he only tried the tamsulosin x 1 dose due to feeling of fatigue the next day. At present e is only taking the finasteride and is urinating well except for hesitancy during the night and nocturia x 2 He would like to stop the finasteride, which he has been on for ~ 4 years.  UA negative PVR 13 cc IPSS 16 ROXIE 19 NAA 9 IN view of mild and stable symptoms, no medication added and finasteride discontinued.  PSA 0.96 (= 1.92, Finasteride effect).  Patient seen  1/20/2022 to reassess. He told me that he is urinating well but with an intermittent stream as his greatest complaint. He remains off medication and is satisfied with that.  UA trace RBC PVR 45 cc Uroflow: Vol 444 cc V Max 14 cc/s V Ave 5 cc/s IPSS 14 NAA 1 We discussed the patient's stable lower urinary tract symptoms off of medication and he finds these to be tolerable. He is not anxious to take medication for this issue and I agree with this desire. I recommended that we reassess in 6 months. As for the microscopic hematuria seen on dipstick evaluation today, I sent urine for microscopic examination culture and cytology. If all are negative, we will reassess in 6 months otherwise we will proceed as indicated by the results. UA Micro negative C+S negative Cytology negative  Patient seen 7/21/2022  to reassess off medication. H continues to urinate well off medication. He has nocturia x 1 - 2 only if he drinks a large amount of water in the evening. He is content to remain off prostate medications. He denies any dysuria or gross hematuria. Patient also describes a small bump on his scrotum that bled after being abraded with a towel recently. He is not aware how long he has had this. UA trace RBC, small WBC URoflow fair to good: Vol 126 cc, V Max 11 cc/s, V Ave 4 cc/s. PVR 4 cc IPSS 8 ROXIE 4 I  reviewed with the patient his present urinary symptoms in view of his uroflow and PVR test today. It is not likely that the medications typically used for the prostate enlargement would help the symptoms in view of these test results today. He is comfortable being off medication and I recommended that he continue this way with reassessment in 6 months at that time he will also require CARLOS. In the meantime we discussed decreasing H2O intake after 5 PM in an effort to minimize any further nocturia. He will try this approach. As for the new problem of the cutaneous scrotal varix, no intervention is indicated at this time, however if it continues to bleed, it would be amenable to cutaneous cauterization in the office under local anesthesia and I discussed this approach with the patient in detail including the indications risks alternatives and chances for success while I do not see the need for it to be done now he will consider it for the future if he has not further episodes of bleeding from this area. As for the asymptomatic microscopic hematuria found on dipstick today, earlier in the year when this was found microscopic evaluation did not confirm blood in the urine and culture and cytology were both negative. I repeated the microscopic UA culture and cytology today. While this finding has not yet been evaluated completely, I we will continue to test the urine as noted above only in the face of a negative microscopic UA. PSA 11.3 UA WBC, C+S negative, Cytology inflammatory cells.  Patient seen 8/3/2022 to reassess PSA, CARLOS, prostate. He remains stable with no new issues. UA trace WBC only, IPSS 14 Pelvic US: Prostate 60 gm, PVR 53 cc.  We discussed the recent spike in the PSA, the etiology of which remains uncertain at this time.  He does not have any significant change in his lower urinary tract symptoms but I did repeat the urine culture based on the trace white blood cells in his UA.  We also sent blood today for total and free PSA test.  If this confirms same elevation in PSA, then we will proceed to prostate MRI and likely needle biopsy.  If it is improved, then we will follow this to its priscilla and then make that decision.  We will plan to follow-up in at most 3 months. Alexsandra Pimentel MD PSA 3.87  Patient seen 1/19/2023 to reassess PSA. A recent PSA of 3.75 was drawn on 10/3/22. His LUTS are stable and tolerable. He denies any gross hematuria. UA negative IPSS 14 Patient continue to be on medication for his HTN and cholesterol.  The patient denies fevers, chills, nausea and or vomiting and no unexplained weight loss.  All pertinent parts of the patient PFSH (past medical, family and social histories), laboratory, radiological studies and physician notes were reviewed prior to starting the face to face portion of the  visit. Questionnaire results were discussed with patient. We discussed his most recent PSA which is stable. CARLOS on exam today was normal. A repeat PSA is pending and we will call him with that result. If this remains stable, he will follow up in 1 year. We also discussed his prior episode of microhematuria, which has resolved on UA dip today and requires no additional intervention at this time. .PSA 3.38 (40%), (=6.76 on finasteride)  Patient seen 2/1/2024 to reassess. He is urinating with tolerable LUTS off any medication having stopped the finasteride and tamsulosin 1 year ego. He had a recent PSA with his PCP 11/20/2023 which returned 3.11 (off the finasteride) and UA showed small RBC. HE also had anemia on CBC. UA trace RBC, not yet evaluated. IPSS 15 NAA 7 ROXIE 0 Patient and wife are no longer sexually active.  Pelvic US: PVR 63ml, Prostate 71gm. We discussed the patient's stable PSA which was repeated today and his normal CARLOS. If the PSA remains stable as it was 3 months ago, then follow-up of this in 1 year. As for the microscopic hematuria which was present today and present to a slightly greater extent on his UA with his PCP 3 months ago, he has not had a hematuria evaluation yet and I recommended that we consider this in view of his mild anemia found on last CBC. To this extent urine sent for culture and cytology, CT scan abdomen pelvis ordered, cystoscopy in the office was scheduled after we reviewed indications, risks, alternatives and chances for success. PSA 3.02 (53%) C+S, cytology both negative CT 2/7/2024 diverticulosis/itis, normal  tract, BPH.  Patient seen TODAY 3/7/2024 for cystoscopy. He reports no interval bleeding noted.  UA negative CYSTOSCOPY: Normal bladder mucosa, clear efflux bilaterally. bilobar obstructing BPH with extension into the bladder, normal urethra.  CYSTOSCOPY: Normal bladder mucosa, clear efflux bilaterally. bilobar obstructing BPH with extension into the bladder, normal urethra. We discussed the cystoscopy findings and the likely cause of his microscopic hematuria being from the enlarged prostate gland. This is also likely cause of his lower urinary tract symptoms. He has been in the past on finasteride and tamsulosin but stopped these in February a year ago because of what he perceived as lack of efficacy. We discussed options moving forward including #1 continued observation as is without medication intervention. #2 reinitiation of finasteride and or tamsulosin with interval reassessment. #3 prostatic reductive intervention either by minimally or moderately invasive methods. I offered a consultation with my partner Dr. Moe concerning #3 if he is interested. For the time being, he has elected to stay off medications and reassess in 3 months. I recommended that he call me if he finds any change in his symptoms in the interval. We will plan to have the patient perform Uroflow and obtain a PVR at that time.   Patient seen TODAY 6/6/2024 to reassess.

## 2024-06-12 NOTE — HISTORY OF PRESENT ILLNESS
[FreeTextEntry1] : 79 YO M seen by me 7/3/2019 for BPH and Peyronie's Disease associated with a dorsal curvature that did not interfere with satisfactory intercourse. He was on finasteride and PSA was 1.36 (41%).  Patient seen on 8/28/2020 by Nick with increase in LUTS, finasteride renewed, tamsulosin trial instituted.  Patient seen 8/21/2021 to FU on these issues. e told me that he only tried the tamsulosin x 1 dose due to feeling of fatigue the next day. At present e is only taking the finasteride and is urinating well except for hesitancy during the night and nocturia x 2 He would like to stop the finasteride, which he has been on for ~ 4 years.  UA negative PVR 13 cc IPSS 16 ROXIE 19 NAA 9 IN view of mild and stable symptoms, no medication added and finasteride discontinued.  PSA 0.96 (= 1.92, Finasteride effect).  Patient seen  1/20/2022 to reassess. He told me that he is urinating well but with an intermittent stream as his greatest complaint. He remains off medication and is satisfied with that.  UA trace RBC PVR 45 cc Uroflow: Vol 444 cc V Max 14 cc/s V Ave 5 cc/s IPSS 14 NAA 1 We discussed the patient's stable lower urinary tract symptoms off of medication and he finds these to be tolerable. He is not anxious to take medication for this issue and I agree with this desire. I recommended that we reassess in 6 months. As for the microscopic hematuria seen on dipstick evaluation today, I sent urine for microscopic examination culture and cytology. If all are negative, we will reassess in 6 months otherwise we will proceed as indicated by the results. UA Micro negative C+S negative Cytology negative  Patient seen 7/21/2022  to reassess off medication. H continues to urinate well off medication. He has nocturia x 1 - 2 only if he drinks a large amount of water in the evening. He is content to remain off prostate medications. He denies any dysuria or gross hematuria. Patient also describes a small bump on his scrotum that bled after being abraded with a towel recently. He is not aware how long he has had this. UA trace RBC, small WBC URoflow fair to good: Vol 126 cc, V Max 11 cc/s, V Ave 4 cc/s. PVR 4 cc IPSS 8 ROXIE 4 I  reviewed with the patient his present urinary symptoms in view of his uroflow and PVR test today. It is not likely that the medications typically used for the prostate enlargement would help the symptoms in view of these test results today. He is comfortable being off medication and I recommended that he continue this way with reassessment in 6 months at that time he will also require CARLOS. In the meantime we discussed decreasing H2O intake after 5 PM in an effort to minimize any further nocturia. He will try this approach. As for the new problem of the cutaneous scrotal varix, no intervention is indicated at this time, however if it continues to bleed, it would be amenable to cutaneous cauterization in the office under local anesthesia and I discussed this approach with the patient in detail including the indications risks alternatives and chances for success while I do not see the need for it to be done now he will consider it for the future if he has not further episodes of bleeding from this area. As for the asymptomatic microscopic hematuria found on dipstick today, earlier in the year when this was found microscopic evaluation did not confirm blood in the urine and culture and cytology were both negative. I repeated the microscopic UA culture and cytology today. While this finding has not yet been evaluated completely, I we will continue to test the urine as noted above only in the face of a negative microscopic UA. PSA 11.3 UA WBC, C+S negative, Cytology inflammatory cells.  Patient seen 8/3/2022 to reassess PSA, CARLOS, prostate. He remains stable with no new issues. UA trace WBC only, IPSS 14 Pelvic US: Prostate 60 gm, PVR 53 cc.  We discussed the recent spike in the PSA, the etiology of which remains uncertain at this time.  He does not have any significant change in his lower urinary tract symptoms but I did repeat the urine culture based on the trace white blood cells in his UA.  We also sent blood today for total and free PSA test.  If this confirms same elevation in PSA, then we will proceed to prostate MRI and likely needle biopsy.  If it is improved, then we will follow this to its priscilla and then make that decision.  We will plan to follow-up in at most 3 months. Alexsandra Pimentel MD PSA 3.87  Patient seen 1/19/2023 to reassess PSA. A recent PSA of 3.75 was drawn on 10/3/22. His LUTS are stable and tolerable. He denies any gross hematuria. UA negative IPSS 14 Patient continue to be on medication for his HTN and cholesterol.  The patient denies fevers, chills, nausea and or vomiting and no unexplained weight loss.  All pertinent parts of the patient PFSH (past medical, family and social histories), laboratory, radiological studies and physician notes were reviewed prior to starting the face to face portion of the  visit. Questionnaire results were discussed with patient. We discussed his most recent PSA which is stable. CARLOS on exam today was normal. A repeat PSA is pending and we will call him with that result. If this remains stable, he will follow up in 1 year. We also discussed his prior episode of microhematuria, which has resolved on UA dip today and requires no additional intervention at this time. .PSA 3.38 (40%), (=6.76 on finasteride)  Patient seen 2/1/2024 to reassess. He is urinating with tolerable LUTS off any medication having stopped the finasteride and tamsulosin 1 year ego. He had a recent PSA with his PCP 11/20/2023 which returned 3.11 (off the finasteride) and UA showed small RBC. HE also had anemia on CBC. UA trace RBC, not yet evaluated. IPSS 15 NAA 7 ROXIE 0 Patient and wife are no longer sexually active.  Pelvic US: PVR 63ml, Prostate 71gm. We discussed the patient's stable PSA which was repeated today and his normal CARLOS. If the PSA remains stable as it was 3 months ago, then follow-up of this in 1 year. As for the microscopic hematuria which was present today and present to a slightly greater extent on his UA with his PCP 3 months ago, he has not had a hematuria evaluation yet and I recommended that we consider this in view of his mild anemia found on last CBC. To this extent urine sent for culture and cytology, CT scan abdomen pelvis ordered, cystoscopy in the office was scheduled after we reviewed indications, risks, alternatives and chances for success. PSA 3.02 (53%) C+S, cytology both negative CT 2/7/2024 diverticulosis/itis, normal  tract, BPH.  Patient seen 3/7/2024 for cystoscopy. He reports no interval bleeding noted.  UA negative CYSTOSCOPY: Normal bladder mucosa, clear efflux bilaterally. bilobar obstructing BPH with extension into the bladder, normal urethra.  We discussed the cystoscopy findings and the likely cause of his microscopic hematuria being from the enlarged prostate gland. This is also likely cause of his lower urinary tract symptoms. He has been in the past on finasteride and tamsulosin but stopped these in February a year ago because of what he perceived as lack of efficacy. We discussed options moving forward including #1 continued observation as is without medication intervention. #2 reinitiation of finasteride and or tamsulosin with interval reassessment. #3 prostatic reductive intervention either by minimally or moderately invasive methods. I offered a consultation with my partner Dr. Moe concerning #3 if he is interested. For the time being, he has elected to stay off medications and reassess in 3 months. I recommended that he call me if he finds any change in his symptoms in the interval. We will plan to have the patient perform Uroflow and obtain a PVR at that time.   [facility] 6/6/2024  Patient seen TODAY 6/14/2024 to reassess.

## 2024-06-14 ENCOUNTER — APPOINTMENT (OUTPATIENT)
Dept: UROLOGY | Facility: CLINIC | Age: 79
End: 2024-06-14

## 2024-09-11 ENCOUNTER — NON-APPOINTMENT (OUTPATIENT)
Age: 79
End: 2024-09-11

## 2025-02-12 ENCOUNTER — APPOINTMENT (OUTPATIENT)
Dept: UROLOGY | Facility: CLINIC | Age: 80
End: 2025-02-12
Payer: MEDICARE

## 2025-02-12 VITALS
DIASTOLIC BLOOD PRESSURE: 70 MMHG | OXYGEN SATURATION: 100 % | SYSTOLIC BLOOD PRESSURE: 154 MMHG | HEART RATE: 64 BPM | TEMPERATURE: 98.4 F

## 2025-02-12 DIAGNOSIS — R35.1 BENIGN PROSTATIC HYPERPLASIA WITH LOWER URINARY TRACT SYMPMS: ICD-10-CM

## 2025-02-12 DIAGNOSIS — N40.1 BENIGN PROSTATIC HYPERPLASIA WITH LOWER URINARY TRACT SYMPMS: ICD-10-CM

## 2025-02-12 DIAGNOSIS — R31.21 ASYMPTOMATIC MICROSCOPIC HEMATURIA: ICD-10-CM

## 2025-02-12 DIAGNOSIS — R97.20 ELEVATED PROSTATE, SPECIFIC ANTIGEN [PSA]: ICD-10-CM

## 2025-02-12 PROCEDURE — 81003 URINALYSIS AUTO W/O SCOPE: CPT | Mod: QW

## 2025-02-12 PROCEDURE — 51798 US URINE CAPACITY MEASURE: CPT

## 2025-02-12 PROCEDURE — 99214 OFFICE O/P EST MOD 30 MIN: CPT

## 2025-02-21 ENCOUNTER — NON-APPOINTMENT (OUTPATIENT)
Age: 80
End: 2025-02-21

## 2025-02-21 LAB
BACTERIA UR CULT: NORMAL
BILIRUB UR QL STRIP: NORMAL
CLARITY UR: CLEAR
COLLECTION METHOD: NORMAL
GLUCOSE UR-MCNC: NORMAL
HCG UR QL: 0.2 EU/DL
HGB UR QL STRIP.AUTO: NORMAL
KETONES UR-MCNC: NORMAL
LEUKOCYTE ESTERASE UR QL STRIP: NORMAL
NITRITE UR QL STRIP: NORMAL
PH UR STRIP: 6
PROT UR STRIP-MCNC: NORMAL
PSA FREE FLD-MCNC: 53 %
PSA FREE SERPL-MCNC: 1.42 NG/ML
PSA SERPL-MCNC: 2.67 NG/ML
SP GR UR STRIP: 1.01
URINE CYTOLOGY: NORMAL

## 2025-09-03 ENCOUNTER — APPOINTMENT (OUTPATIENT)
Dept: UROLOGY | Facility: CLINIC | Age: 80
End: 2025-09-03
Payer: MEDICARE

## 2025-09-03 ENCOUNTER — NON-APPOINTMENT (OUTPATIENT)
Age: 80
End: 2025-09-03

## 2025-09-03 VITALS
TEMPERATURE: 97.6 F | SYSTOLIC BLOOD PRESSURE: 139 MMHG | HEART RATE: 73 BPM | BODY MASS INDEX: 23.62 KG/M2 | DIASTOLIC BLOOD PRESSURE: 58 MMHG | WEIGHT: 165 LBS | HEIGHT: 70 IN

## 2025-09-03 DIAGNOSIS — R31.21 ASYMPTOMATIC MICROSCOPIC HEMATURIA: ICD-10-CM

## 2025-09-03 DIAGNOSIS — R35.1 BENIGN PROSTATIC HYPERPLASIA WITH LOWER URINARY TRACT SYMPMS: ICD-10-CM

## 2025-09-03 DIAGNOSIS — N40.1 BENIGN PROSTATIC HYPERPLASIA WITH LOWER URINARY TRACT SYMPMS: ICD-10-CM

## 2025-09-03 PROCEDURE — 51798 US URINE CAPACITY MEASURE: CPT

## 2025-09-03 PROCEDURE — 99214 OFFICE O/P EST MOD 30 MIN: CPT

## 2025-09-03 PROCEDURE — G2211 COMPLEX E/M VISIT ADD ON: CPT

## 2025-09-04 LAB
APPEARANCE: CLEAR
BACTERIA: NEGATIVE /HPF
BILIRUBIN URINE: NEGATIVE
BLOOD URINE: NEGATIVE
CAST: 0 /LPF
COLOR: YELLOW
EPITHELIAL CELLS: 0 /HPF
GLUCOSE QUALITATIVE U: NEGATIVE MG/DL
KETONES URINE: NEGATIVE MG/DL
LEUKOCYTE ESTERASE URINE: ABNORMAL
MICROSCOPIC-UA: NORMAL
NITRITE URINE: NEGATIVE
PH URINE: 5.5
PROTEIN URINE: 30 MG/DL
RED BLOOD CELLS URINE: 0 /HPF
SPECIFIC GRAVITY URINE: 1.01
UROBILINOGEN URINE: 0.2 MG/DL
WHITE BLOOD CELLS URINE: 10 /HPF

## 2025-09-05 LAB — BACTERIA UR CULT: NORMAL

## 2025-09-15 ENCOUNTER — NON-APPOINTMENT (OUTPATIENT)
Age: 80
End: 2025-09-15